# Patient Record
Sex: MALE | Race: WHITE
[De-identification: names, ages, dates, MRNs, and addresses within clinical notes are randomized per-mention and may not be internally consistent; named-entity substitution may affect disease eponyms.]

---

## 2019-01-22 ENCOUNTER — HOSPITAL ENCOUNTER (EMERGENCY)
Dept: HOSPITAL 72 - EMR | Age: 84
Discharge: NURSING, REHAB OR LONG-TERM CARE | End: 2019-01-22
Payer: MEDICARE

## 2019-01-22 VITALS — SYSTOLIC BLOOD PRESSURE: 124 MMHG | DIASTOLIC BLOOD PRESSURE: 76 MMHG

## 2019-01-22 VITALS — DIASTOLIC BLOOD PRESSURE: 81 MMHG | SYSTOLIC BLOOD PRESSURE: 130 MMHG

## 2019-01-22 VITALS — DIASTOLIC BLOOD PRESSURE: 92 MMHG | SYSTOLIC BLOOD PRESSURE: 110 MMHG

## 2019-01-22 VITALS — BODY MASS INDEX: 21 KG/M2 | HEIGHT: 71 IN | WEIGHT: 150 LBS

## 2019-01-22 DIAGNOSIS — S01.112A: Primary | ICD-10-CM
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
## 2019-01-22 PROCEDURE — 70450 CT HEAD/BRAIN W/O DYE: CPT

## 2019-01-22 PROCEDURE — 99284 EMERGENCY DEPT VISIT MOD MDM: CPT

## 2019-01-22 NOTE — NUR
ED Nurse Note:

BROUGHT IN BY [facility] DUE TO LAC TO UPPER LEFT EYEBROW. NOT ACTIVELY BLEEDING AT 
THIS TIME. PT HAS CUTS ON LEFT ARM THAT AR BANDAGE. PT IS COMING FROM [facility]. PT DENIES PAIN AT THIS TIME. PER EMT, PT HISTORY OF SCHIZOPHRENIA. PER 
EMT RISK FOR AWOL. SITTER IS PRESENT AT BEDSIDE VITAL SIGNS ARE STABLE THE 
MOMENT. PT ON ROOM AIR 99%, 110/92 BP, HR IS 79. NO FEVER 97.5 F

## 2019-01-22 NOTE — DIAGNOSTIC IMAGING REPORT
Indication: Head trauma, status post fall, laceration above left eyebrow

 

Technique: spiral acquisitions obtained through the brain. Angled axial and coronal 5

x 5 mm slices were reconstructed. No IV contrast utilized.  Radiation dose was

minimized using automated exposure control

 

 

Total dose length product 1425.35 mGycm. CTDIvol(s) 70.38 mGy

 

Comparison: none

 

FINDINGS: No acute hemorrhage or edema. No mass effect or midline shift. There is

age-related enlargement of the ventricles and extra axial CSF spaces. There is

periventricular deep white matter ischemic change. Normal gray-white differentiation.

Visualized orbits  are unremarkable. Visualized sinuses are unremarkable.  Intact

calvarium. There is minimal left supraorbital soft tissue swelling demonstrated. The

mastoids are clear.

 

IMPRESSION: Chronic and age-related changes. Negative for acute intracranial bleed or

mass effect

 

Minimal left supraorbital scalp soft tissue contusion

 

The CT scanner at [facility] is accredited by the [employer] College of

[employer]iology and the scans are performed using protocols designed to limit radiation

exposure to as low as reasonably achievable to attain images of sufficient resolution

adequate for diagnostic evaluation

## 2019-01-22 NOTE — EMERGENCY ROOM REPORT
History of Present Illness


General


Chief Complaint:  Laceration


Source:  Patient, EMS





Present Illness


HPI


Patient presents with trauma to the left facial region history


Patient himself is a very poor historian


Somewhat noncompliant and combative as well


It was reported by paramedics that at the nursing facility patient has 

sustained a fall injury to the left eyebrow





Unknown report of lapse of consciousness patient denies any complaints at this 

time as far as weakness or chest pain


Allergies:  


Coded Allergies:  


     No Known Allergies (Unverified , 11/24/15)





Patient History


Limited by:  medical condition


Past Medical History:  see triage record


Pertinent Family History:  unable to obtain


Reviewed Nursing Documentation:  PMH: Agreed; PSxH: Agreed





Nursing Documentation-PMH


Past Medical History:  No History, Except For


History Of Psychiatric Problem:  Yes - DEMENTIA, SCHIZO





Review of Systems


All Other Systems:  limited - Other than the ones mentioned in the history of 

present illness all others are reviewed however they do stay limited due to the 

patient's mental status





Physical Exam





Vital Signs








  Date Time  Temp Pulse Resp B/P (MAP) Pulse Ox O2 Delivery O2 Flow Rate FiO2


 


1/22/19 11:25 97.5 107 16 135/77 93 Room Air  








Sp02 EP Interpretation:  reviewed, normal


General Appearance:  no apparent distress


Head:  other - Steri-Strips in place left lateral eyebrow no active bleeding


Eyes:  bilateral eye PERRL


ENT:  normal pharynx


Neck:  supple


Respiratory:  lungs clear, no retraction, no accessory muscle use


Cardiovascular #1:  regular rate, rhythm


Gastrointestinal:  non tender, soft


Musculoskeletal:  normal inspection


Neurologic:  alert, responsive


Skin:  other - Mild ecchymosis left lateral eyebrow, on attempt of removal of 

the Steri-Strips for further evaluation patient grabs my hand and Stastny for 

further examination


Lymphatic:  no adenopathy





Medical Decision Making


Diagnostic Impression:  


 Primary Impression:  


 Laceration


 Additional Impression:  


 Head injury


ER Course


Multiple differentials considered


Including but not limited to neurological neurosurgical pathology patient has 

CT head obtained no obvious acute pathology is seen








Unfortunately patient is not allowing us to visualize the area under the Steri-

Strips


However appears to be appropriately covered and not expanding


And patient is


Deferred for further outpatient nursing home care


CT/MRI/US Diagnostic Results


CT/MRI/US Diagnostic Results :  


   Impression


CT head no acute disease





Last Vital Signs








  Date Time  Temp Pulse Resp B/P (MAP) Pulse Ox O2 Delivery O2 Flow Rate FiO2


 


1/22/19 11:54 97.5 79 16 110/92 99 Room Air  








Status:  improved


Disposition:  XFER SNF


Condition:  Improved


Referrals:  


iKmberly Archuleta MD (PCP)





Additional Instructions:  


Patient is provided with the discharge instructions notified to follow up with 

primary doctor in the next 2-3 days otherwise return to the er with any 

worsening symptoms.


Please note that this report is being documented using DRAGON technology.  This 

can lead to erroneous entry secondary to incorrect interpretation by the 

dictating instrument.











Viri Tubbs DO Jan 22, 2019 12:23

## 2019-01-22 NOTE — NUR
ED Nurse Note:



per emt, he was tying his shoes at [employer] and hit his head on a cabinet. 
when asked pt he said "what do you want! I play soccer all the time and fall 
all the time it doesnt hurt, its not a big deal".